# Patient Record
Sex: OTHER/UNKNOWN | URBAN - METROPOLITAN AREA
[De-identification: names, ages, dates, MRNs, and addresses within clinical notes are randomized per-mention and may not be internally consistent; named-entity substitution may affect disease eponyms.]

---

## 2023-07-24 ENCOUNTER — APPOINTMENT (OUTPATIENT)
Dept: URBAN - METROPOLITAN AREA SURGERY 21 | Age: 33
Setting detail: DERMATOLOGY
End: 2023-07-24

## 2023-07-24 DIAGNOSIS — D22 MELANOCYTIC NEVI: ICD-10-CM

## 2023-07-24 DIAGNOSIS — L81.4 OTHER MELANIN HYPERPIGMENTATION: ICD-10-CM

## 2023-07-24 PROBLEM — D22.5 MELANOCYTIC NEVI OF TRUNK: Status: ACTIVE | Noted: 2023-07-24

## 2023-07-24 PROCEDURE — OTHER COUNSELING: OTHER

## 2023-07-24 PROCEDURE — 99202 OFFICE O/P NEW SF 15 MIN: CPT

## 2023-07-24 ASSESSMENT — LOCATION SIMPLE DESCRIPTION DERM
LOCATION SIMPLE: ABDOMEN
LOCATION SIMPLE: RIGHT POSTERIOR THIGH
LOCATION SIMPLE: LEFT POPLITEAL SKIN
LOCATION SIMPLE: LEFT POSTERIOR UPPER ARM
LOCATION SIMPLE: LEFT PRETIBIAL REGION
LOCATION SIMPLE: RIGHT UPPER BACK
LOCATION SIMPLE: LEFT UPPER BACK
LOCATION SIMPLE: RIGHT POSTERIOR UPPER ARM
LOCATION SIMPLE: RIGHT SHOULDER

## 2023-07-24 ASSESSMENT — LOCATION DETAILED DESCRIPTION DERM
LOCATION DETAILED: LEFT SUPERIOR MEDIAL UPPER BACK
LOCATION DETAILED: RIGHT SUPERIOR UPPER BACK
LOCATION DETAILED: LEFT DISTAL PRETIBIAL REGION
LOCATION DETAILED: LEFT SUPERIOR LATERAL UPPER BACK
LOCATION DETAILED: RIGHT DISTAL POSTERIOR THIGH
LOCATION DETAILED: RIGHT RIB CAGE
LOCATION DETAILED: RIGHT PROXIMAL POSTERIOR UPPER ARM
LOCATION DETAILED: LEFT POPLITEAL SKIN
LOCATION DETAILED: RIGHT POSTERIOR SHOULDER
LOCATION DETAILED: LEFT PROXIMAL POSTERIOR UPPER ARM
LOCATION DETAILED: LEFT SUPERIOR UPPER BACK
LOCATION DETAILED: RIGHT SUPERIOR MEDIAL UPPER BACK

## 2023-07-24 ASSESSMENT — LOCATION ZONE DERM
LOCATION ZONE: TRUNK
LOCATION ZONE: LEG
LOCATION ZONE: ARM

## 2023-07-24 NOTE — HPI: SKIN LESION
Is This A New Presentation, Or A Follow-Up?: Skin Lesion Obdulia Ray Patient Age: 64 year old  MESSAGE: Interpreting service used: No      IM/FP- Medication Question-         Name of the Pharmacy: [facility]    Name of the medication: tramadol     Question about: Medication Ordered but Not Received at Pharmacy       Is the patient currently at the pharmacy? No- Select provider's home site [facility]- Connect call to [facility] queue- Route message to provider's clinical support pool           WEIGHT AND HEIGHT:   Wt Readings from Last 1 Encounters:   03/09/20 113.9 kg (251 lb)     Ht Readings from Last 1 Encounters:   03/09/20 5' 2\" (1.575 m)     BMI Readings from Last 1 Encounters:   03/09/20 45.91 kg/m²       ALLERGIES:  Amoxicillin  Current Outpatient Medications   Medication   • tramadol (ULTRAM-ER) 100 MG 24 hr tablet   • naproxen (NAPROSYN) 500 MG tablet   • acetaminophen-codeine (TYLENOL NO.3) 300-30 MG per tablet   • methylPREDNISolone (MEDROL DOSEPAK) 4 MG tablet   • acetaminophen-codeine (TYLENOL NO.3) 300-30 MG per tablet   • naproxen (NAPROSYN) 500 MG tablet   • hydrochlorothiazide (HYDRODIURIL) 25 MG tablet   • lisinopril (ZESTRIL) 20 MG tablet   • metoPROLOL succinate (TOPROL-XL) 50 MG 24 hr tablet   • MAGNESIUM OXIDE PO   • Multiple Vitamin (MULTI VITAMIN PO)   • Glucosamine HCl (GLUCOSAMINE PO)   • meclizine HCl (ANTIVERT) 25 MG tablet   • vitamin E 1000 units capsule     No current facility-administered medications for this visit.      PHARMACY to use: see below           Pharmacy preference(s) on file:   [id number] - [place], [place] - [facility] [place] [address] [address] [address]  Phone: [phone] Fax: [phone]      CALL BACK INFO: Ok to leave response (including medical information) with family member or on answering machine  ROUTING: Patient's physician/staff        PCP: Genesis Bonilla MD         INS: Payor: [employer] [place] / Plan: BLUE [id number] / Product Type: PPO MISC   PATIENT ADDRESS:  [address] [place] [address] [address] [place]  [id number]